# Patient Record
Sex: FEMALE | Race: WHITE | NOT HISPANIC OR LATINO | Employment: FULL TIME | URBAN - NONMETROPOLITAN AREA
[De-identification: names, ages, dates, MRNs, and addresses within clinical notes are randomized per-mention and may not be internally consistent; named-entity substitution may affect disease eponyms.]

---

## 2024-03-26 ENCOUNTER — OFFICE VISIT (OUTPATIENT)
Dept: PRIMARY CARE | Facility: CLINIC | Age: 39
End: 2024-03-26
Payer: COMMERCIAL

## 2024-03-26 VITALS
HEIGHT: 66 IN | BODY MASS INDEX: 31.5 KG/M2 | WEIGHT: 196 LBS | HEART RATE: 85 BPM | OXYGEN SATURATION: 98 % | SYSTOLIC BLOOD PRESSURE: 116 MMHG | DIASTOLIC BLOOD PRESSURE: 70 MMHG

## 2024-03-26 DIAGNOSIS — F32.9 REACTIVE DEPRESSION: ICD-10-CM

## 2024-03-26 DIAGNOSIS — D22.9 MULTIPLE ATYPICAL SKIN MOLES: ICD-10-CM

## 2024-03-26 DIAGNOSIS — Z13.220 LIPID SCREENING: ICD-10-CM

## 2024-03-26 DIAGNOSIS — Z12.31 ENCOUNTER FOR SCREENING MAMMOGRAM FOR MALIGNANT NEOPLASM OF BREAST: Primary | ICD-10-CM

## 2024-03-26 DIAGNOSIS — R53.83 OTHER FATIGUE: ICD-10-CM

## 2024-03-26 PROCEDURE — 1036F TOBACCO NON-USER: CPT | Performed by: INTERNAL MEDICINE

## 2024-03-26 PROCEDURE — 99204 OFFICE O/P NEW MOD 45 MIN: CPT | Performed by: INTERNAL MEDICINE

## 2024-03-26 RX ORDER — ESCITALOPRAM OXALATE 10 MG/1
10 TABLET ORAL DAILY
Qty: 30 TABLET | Refills: 5 | Status: SHIPPED | OUTPATIENT
Start: 2024-03-26 | End: 2024-09-22

## 2024-03-26 ASSESSMENT — ENCOUNTER SYMPTOMS
ARTHRALGIAS: 0
SHORTNESS OF BREATH: 0
NAUSEA: 0
BACK PAIN: 0
BLOOD IN STOOL: 0
DIARRHEA: 0
WHEEZING: 0
PALPITATIONS: 0
VOMITING: 0
COUGH: 0
ABDOMINAL PAIN: 0
FATIGUE: 1

## 2024-03-26 ASSESSMENT — PATIENT HEALTH QUESTIONNAIRE - PHQ9
2. FEELING DOWN, DEPRESSED OR HOPELESS: SEVERAL DAYS
10. IF YOU CHECKED OFF ANY PROBLEMS, HOW DIFFICULT HAVE THESE PROBLEMS MADE IT FOR YOU TO DO YOUR WORK, TAKE CARE OF THINGS AT HOME, OR GET ALONG WITH OTHER PEOPLE: SOMEWHAT DIFFICULT
1. LITTLE INTEREST OR PLEASURE IN DOING THINGS: SEVERAL DAYS
SUM OF ALL RESPONSES TO PHQ9 QUESTIONS 1 AND 2: 2

## 2024-03-26 NOTE — ASSESSMENT & PLAN NOTE
-She lost her  in 2020 and she is still struggling some days with signs and symptoms of depression  -She has had prior counseling  -I have suggested that she would benefit from taking escitalopram and I will give her instructions on this medication  -We will see her back in approximately 3 weeks

## 2024-03-26 NOTE — PATIENT INSTRUCTIONS
As we discussed I have ordered lab work to assess your symptoms of fatigue and in that lab work is included a cholesterol profile and blood sugar.  We would like for you to be fasting anywhere from 8 to 12 hours.  For example if you decided to come to the lab at 8 AM you would simply cut off anything with calories after 8 PM the night before.  You can have water and in fact we encourage you to stay well-hydrated for your lab draw.  You can even have coffee as long as you do not put cream or sugar in it  The staff will also be helping you to get an appointment with the dermatologist so that they can inspect all of your moles and make sure everything is okay  I also sent a prescription to your pharmacy for medication called Lexapro or Escitalopram.  It is simply a 10 mg tablet.  For the first 4 days I would like for you to break it in half and simply take a 5 mg dose so that you can get used to the medication.  After that you will take a full 10 mg dose thereafter.  We recommend that you take it every morning but if it makes you feel tired then go ahead and switch it to bedtime.  Please take this every day and please call if you are not doing well with this medication.  It can take up to 3 weeks for you to start to feel a positive affect and the plateau effect is typically not appreciated until the 6 to 8-week.  I would like to see you back at week 3 to see how you are doing.  The staff will also be helping you to schedule your screening mammogram

## 2024-03-26 NOTE — PROGRESS NOTES
Subjective   Patient ID: Chaya Li is a 38 y.o. female who presents for Establish Care.  HPI  She is here today to get established and we spent some time reviewing her past medical history and her medical concerns.  Unfortunately her  passed away at age 38 back in 2020 and it has been difficult for her and her children.  She states they did do some counseling early on and she stays busy taking care of her children and getting them to their school and their social activities.  She states she sometimes feels withdrawn and avoids going out for social interactions unless somebody approaches her about getting together at [place].  She states at times she feels down and it has been a difficult winter for her.  We did conduct a review of systems and she does complain of some fatigue.  She would like a checkup to make sure she is healthy and we talked about doing several lab tests including a screening lipid profile.  We also see that since she is 38 it would be recommended she have a screening mammogram and we will help get that scheduled.  We talked about her mood and I do feel that she might benefit from starting a low-dose medication such as escitalopram.  We also discussed how exercise could also improve her sense of wellbeing.  We also see on today's examination she has multiple moles and atypical freckles.  I will be sending her to a dermatologist for a full body skin inspection.  We plan on seeing her back to go over test results and she will take the S-Citalopram until her next follow-up visit.  Review of Systems   Constitutional:  Positive for fatigue.   Respiratory:  Negative for cough, shortness of breath and wheezing.    Cardiovascular:  Negative for chest pain, palpitations and leg swelling.   Gastrointestinal:  Negative for abdominal pain, blood in stool, diarrhea, nausea and vomiting.   Musculoskeletal:  Negative for arthralgias and back pain.     Objective   Physical Exam  Vitals and  nursing note reviewed.   Constitutional:       General: She is not in acute distress.     Appearance: Normal appearance.   HENT:      Head: Normocephalic and atraumatic.      Right Ear: External ear normal.      Left Ear: External ear normal.   Eyes:      Conjunctiva/sclera: Conjunctivae normal.   Cardiovascular:      Rate and Rhythm: Normal rate and regular rhythm.      Heart sounds: Normal heart sounds.   Pulmonary:      Effort: Pulmonary effort is normal. No respiratory distress.      Breath sounds: No wheezing.   Abdominal:      Palpations: Abdomen is soft.      Tenderness: There is no abdominal tenderness. There is no guarding.   Musculoskeletal:         General: No swelling. Normal range of motion.   Skin:     General: Skin is warm and dry.   Neurological:      General: No focal deficit present.      Mental Status: She is alert and oriented to person, place, and time.   Psychiatric:         Behavior: Behavior normal.       Assessment/Plan   Problem List Items Addressed This Visit             ICD-10-CM    Lipid screening - Primary Z13.220     -I am ordering a screening lipid profile and she will get that done before her next follow-up visit in 3 weeks         Relevant Orders    Lipid panel    Other fatigue R53.83     -We will be ordering several labs to assess her symptoms of fatigue         Relevant Orders    CBC and Auto Differential    Comprehensive Metabolic Panel    TSH    Reactive depression F32.9     -She lost her  in 2020 and she is still struggling some days with signs and symptoms of depression  -She has had prior counseling  -I have suggested that she would benefit from taking escitalopram and I will give her instructions on this medication  -We will see her back in approximately 3 weeks         Relevant Medications    escitalopram (Lexapro) 10 mg tablet    Multiple atypical skin moles D22.9     -I am referring her to dermatology for a full body skin inspection         Relevant Orders     Referral to Dermatology          [facility], DO

## 2024-03-26 NOTE — ASSESSMENT & PLAN NOTE
-I am ordering a screening lipid profile and she will get that done before her next follow-up visit in 3 weeks

## 2024-04-10 ENCOUNTER — LAB (OUTPATIENT)
Dept: LAB | Facility: LAB | Age: 39
End: 2024-04-10
Payer: COMMERCIAL

## 2024-04-10 DIAGNOSIS — R53.83 OTHER FATIGUE: ICD-10-CM

## 2024-04-10 DIAGNOSIS — Z13.220 LIPID SCREENING: ICD-10-CM

## 2024-04-10 LAB
ALBUMIN SERPL BCP-MCNC: 4.3 G/DL (ref 3.4–5)
ALP SERPL-CCNC: 57 U/L (ref 33–110)
ALT SERPL W P-5'-P-CCNC: 18 U/L (ref 7–45)
ANION GAP SERPL CALC-SCNC: 12 MMOL/L (ref 10–20)
AST SERPL W P-5'-P-CCNC: 17 U/L (ref 9–39)
BASOPHILS # BLD AUTO: 0.03 X10*3/UL (ref 0–0.1)
BASOPHILS NFR BLD AUTO: 0.5 %
BILIRUB SERPL-MCNC: 0.5 MG/DL (ref 0–1.2)
BUN SERPL-MCNC: 9 MG/DL (ref 6–23)
CALCIUM SERPL-MCNC: 8.9 MG/DL (ref 8.6–10.3)
CHLORIDE SERPL-SCNC: 104 MMOL/L (ref 98–107)
CHOLEST SERPL-MCNC: 179 MG/DL (ref 0–199)
CHOLESTEROL/HDL RATIO: 3.7
CO2 SERPL-SCNC: 25 MMOL/L (ref 21–32)
CREAT SERPL-MCNC: 0.66 MG/DL (ref 0.5–1.05)
EGFRCR SERPLBLD CKD-EPI 2021: >90 ML/MIN/1.73M*2
EOSINOPHIL # BLD AUTO: 0.08 X10*3/UL (ref 0–0.7)
EOSINOPHIL NFR BLD AUTO: 1.4 %
ERYTHROCYTE [DISTWIDTH] IN BLOOD BY AUTOMATED COUNT: 13.1 % (ref 11.5–14.5)
GLUCOSE SERPL-MCNC: 88 MG/DL (ref 74–99)
HCT VFR BLD AUTO: 40.9 % (ref 36–46)
HDLC SERPL-MCNC: 49 MG/DL
HGB BLD-MCNC: 13.1 G/DL (ref 12–16)
IMM GRANULOCYTES # BLD AUTO: 0.01 X10*3/UL (ref 0–0.7)
IMM GRANULOCYTES NFR BLD AUTO: 0.2 % (ref 0–0.9)
LDLC SERPL CALC-MCNC: 116 MG/DL
LYMPHOCYTES # BLD AUTO: 1.47 X10*3/UL (ref 1.2–4.8)
LYMPHOCYTES NFR BLD AUTO: 26.5 %
MCH RBC QN AUTO: 29.8 PG (ref 26–34)
MCHC RBC AUTO-ENTMCNC: 32 G/DL (ref 32–36)
MCV RBC AUTO: 93 FL (ref 80–100)
MONOCYTES # BLD AUTO: 0.47 X10*3/UL (ref 0.1–1)
MONOCYTES NFR BLD AUTO: 8.5 %
NEUTROPHILS # BLD AUTO: 3.48 X10*3/UL (ref 1.2–7.7)
NEUTROPHILS NFR BLD AUTO: 62.9 %
NON HDL CHOLESTEROL: 130 MG/DL (ref 0–149)
NRBC BLD-RTO: 0 /100 WBCS (ref 0–0)
PLATELET # BLD AUTO: 220 X10*3/UL (ref 150–450)
POTASSIUM SERPL-SCNC: 4.1 MMOL/L (ref 3.5–5.3)
PROT SERPL-MCNC: 6.9 G/DL (ref 6.4–8.2)
RBC # BLD AUTO: 4.4 X10*6/UL (ref 4–5.2)
SODIUM SERPL-SCNC: 137 MMOL/L (ref 136–145)
TRIGL SERPL-MCNC: 70 MG/DL (ref 0–149)
TSH SERPL-ACNC: 15.99 MIU/L (ref 0.44–3.98)
VLDL: 14 MG/DL (ref 0–40)
WBC # BLD AUTO: 5.5 X10*3/UL (ref 4.4–11.3)

## 2024-04-10 PROCEDURE — 84443 ASSAY THYROID STIM HORMONE: CPT

## 2024-04-10 PROCEDURE — 85025 COMPLETE CBC W/AUTO DIFF WBC: CPT

## 2024-04-10 PROCEDURE — 80053 COMPREHEN METABOLIC PANEL: CPT

## 2024-04-10 PROCEDURE — 80061 LIPID PANEL: CPT

## 2024-04-10 PROCEDURE — 36415 COLL VENOUS BLD VENIPUNCTURE: CPT

## 2024-04-16 ENCOUNTER — LAB (OUTPATIENT)
Dept: LAB | Facility: LAB | Age: 39
End: 2024-04-16
Payer: COMMERCIAL

## 2024-04-16 ENCOUNTER — OFFICE VISIT (OUTPATIENT)
Dept: PRIMARY CARE | Facility: CLINIC | Age: 39
End: 2024-04-16
Payer: COMMERCIAL

## 2024-04-16 VITALS
SYSTOLIC BLOOD PRESSURE: 118 MMHG | BODY MASS INDEX: 31.18 KG/M2 | HEART RATE: 80 BPM | OXYGEN SATURATION: 98 % | HEIGHT: 66 IN | DIASTOLIC BLOOD PRESSURE: 72 MMHG | WEIGHT: 194 LBS

## 2024-04-16 DIAGNOSIS — E03.9 ACQUIRED HYPOTHYROIDISM: ICD-10-CM

## 2024-04-16 DIAGNOSIS — F32.9 REACTIVE DEPRESSION: ICD-10-CM

## 2024-04-16 DIAGNOSIS — E03.9 ACQUIRED HYPOTHYROIDISM: Primary | ICD-10-CM

## 2024-04-16 LAB — T4 FREE SERPL-MCNC: 0.6 NG/DL (ref 0.61–1.12)

## 2024-04-16 PROCEDURE — 99213 OFFICE O/P EST LOW 20 MIN: CPT | Performed by: INTERNAL MEDICINE

## 2024-04-16 PROCEDURE — 86376 MICROSOMAL ANTIBODY EACH: CPT

## 2024-04-16 PROCEDURE — 36415 COLL VENOUS BLD VENIPUNCTURE: CPT

## 2024-04-16 PROCEDURE — 1036F TOBACCO NON-USER: CPT | Performed by: INTERNAL MEDICINE

## 2024-04-16 PROCEDURE — 84439 ASSAY OF FREE THYROXINE: CPT

## 2024-04-16 PROCEDURE — 84481 FREE ASSAY (FT-3): CPT

## 2024-04-16 RX ORDER — LEVOTHYROXINE SODIUM 25 UG/1
25 TABLET ORAL
Qty: 30 TABLET | Refills: 11 | Status: SHIPPED | OUTPATIENT
Start: 2024-04-16 | End: 2025-04-16

## 2024-04-16 NOTE — PROGRESS NOTES
Subjective   Patient ID: Chaya Li is a 38 y.o. female who presents for No chief complaint on file..  HPI  She is here today for follow-up.  Since her last visit she took the Lexapro as directed and she states she has noticed a change in her sense of wellbeing.  She states that when there is a stressful situation going on she is more relaxed.  She gave the example of visitors from the [place].  She states normally she would be rushing around and stressed but she was able to be more relaxed and actually had a very enjoyable time.  We talked about continuing the Lexapro and I told her that as the weeks go on she may even noticed a little bit more improvement.  She also states she has an appointment to see the dermatologist for full body skin inspection.  We also reviewed her laboratory test results and for the most part everything looked great with the exception of her TSH.  She is showing signs of hypothyroidism.  I will have her get some additional lab work today before leaving and I will contact her with the results.  I also sent to her via [employer] handout on lowering cholesterol and hypothyroidism.  I am starting on a very low-dose medication and I will give her specific instructions on the way it is taken.  She of course will call with any problems and otherwise we will see her back in approximately 2 months with a follow-up lab test.  Objective   Physical Exam  Vitals and nursing note reviewed.   Constitutional:       General: She is not in acute distress.     Appearance: Normal appearance.   HENT:      Head: Normocephalic and atraumatic.   Eyes:      Conjunctiva/sclera: Conjunctivae normal.   Cardiovascular:      Rate and Rhythm: Normal rate and regular rhythm.      Heart sounds: Normal heart sounds.   Pulmonary:      Effort: No respiratory distress.      Breath sounds: No wheezing.   Abdominal:      Palpations: Abdomen is soft.      Tenderness: There is no abdominal tenderness. There is no guarding.    Musculoskeletal:         General: No swelling. Normal range of motion.   Skin:     General: Skin is warm and dry.   Neurological:      General: No focal deficit present.      Mental Status: She is alert and oriented to person, place, and time.   Psychiatric:         Behavior: Behavior normal.       Recent Results (from the past 672 hour(s))   CBC and Auto Differential    Collection Time: 04/10/24  9:20 AM   Result Value Ref Range    WBC 5.5 4.4 - 11.3 x10*3/uL    nRBC 0.0 0.0 - 0.0 /100 WBCs    RBC 4.40 4.00 - 5.20 x10*6/uL    Hemoglobin 13.1 12.0 - 16.0 g/dL    Hematocrit 40.9 36.0 - 46.0 %    MCV 93 80 - 100 fL    MCH 29.8 26.0 - 34.0 pg    MCHC 32.0 32.0 - 36.0 g/dL    RDW 13.1 11.5 - 14.5 %    Platelets 220 150 - 450 x10*3/uL    Neutrophils % 62.9 40.0 - 80.0 %    Immature Granulocytes %, Automated 0.2 0.0 - 0.9 %    Lymphocytes % 26.5 13.0 - 44.0 %    Monocytes % 8.5 2.0 - 10.0 %    Eosinophils % 1.4 0.0 - 6.0 %    Basophils % 0.5 0.0 - 2.0 %    Neutrophils Absolute 3.48 1.20 - 7.70 x10*3/uL    Immature Granulocytes Absolute, Automated 0.01 0.00 - 0.70 x10*3/uL    Lymphocytes Absolute 1.47 1.20 - 4.80 x10*3/uL    Monocytes Absolute 0.47 0.10 - 1.00 x10*3/uL    Eosinophils Absolute 0.08 0.00 - 0.70 x10*3/uL    Basophils Absolute 0.03 0.00 - 0.10 x10*3/uL   Comprehensive Metabolic Panel    Collection Time: 04/10/24  9:20 AM   Result Value Ref Range    Glucose 88 74 - 99 mg/dL    Sodium 137 136 - 145 mmol/L    Potassium 4.1 3.5 - 5.3 mmol/L    Chloride 104 98 - 107 mmol/L    Bicarbonate 25 21 - 32 mmol/L    Anion Gap 12 10 - 20 mmol/L    Urea Nitrogen 9 6 - 23 mg/dL    Creatinine 0.66 0.50 - 1.05 mg/dL    eGFR >90 >60 mL/min/1.73m*2    Calcium 8.9 8.6 - 10.3 mg/dL    Albumin 4.3 3.4 - 5.0 g/dL    Alkaline Phosphatase 57 33 - 110 U/L    Total Protein 6.9 6.4 - 8.2 g/dL    AST 17 9 - 39 U/L    Bilirubin, Total 0.5 0.0 - 1.2 mg/dL    ALT 18 7 - 45 U/L   TSH    Collection Time: 04/10/24  9:20 AM   Result Value Ref  Range    Thyroid Stimulating Hormone 15.99 (H) 0.44 - 3.98 mIU/L   Lipid panel    Collection Time: 04/10/24  9:20 AM   Result Value Ref Range    Cholesterol 179 0 - 199 mg/dL    HDL-Cholesterol 49.0 mg/dL    Cholesterol/HDL Ratio 3.7     LDL Calculated 116 (H) <=99 mg/dL    VLDL 14 0 - 40 mg/dL    Triglycerides 70 0 - 149 mg/dL    Non HDL Cholesterol 130 0 - 149 mg/dL       Assessment/Plan   Problem List Items Addressed This Visit             ICD-10-CM    Reactive depression F32.9     -She will continue on the Lexapro 10 mg daily         Acquired hypothyroidism - Primary E03.9     -She has evidence of hypothyroidism  ]-She will get a free T4, free T3, and thyroid peroxidase antibody on the way out today and have agreed to contact her with results  -I am starting her on levothyroxine 25 mcg daily  -I sent her a handout explaining hypothyroidism and we will see her back in approximately 2 months with a repeat TSH         Relevant Medications    levothyroxine (Synthroid) 25 mcg tablet    Other Relevant Orders    T4, free    T3, free    Thyroid Peroxidase (TPO) Antibody    TSH          Roselia De Jesus DO

## 2024-04-16 NOTE — ASSESSMENT & PLAN NOTE
-She has evidence of hypothyroidism  ]-She will get a free T4, free T3, and thyroid peroxidase antibody on the way out today and have agreed to contact her with results  -I am starting her on levothyroxine 25 mcg daily  -I sent her a handout explaining hypothyroidism and we will see her back in approximately 2 months with a repeat TSH

## 2024-04-16 NOTE — PATIENT INSTRUCTIONS
Before leaving today I will have you stop at the lab to get some additional thyroid blood work and once the results are known I will contact you  I also sent to your pharmacy a prescription for levothyroxine 25 mcg to be taken once daily every day.  This medication can be affected by taking medicine, supplements, or food at the same time.  Unfortunately the thyroid medicine can bind to calcium and then you do not absorb it very well.  Please try to take your thyroid medication and isolation with a glass of water.  You should wait an hour before taking any other medicines, supplements, or food.  You could take this in the evening about 4 hours after your supper i.e. before bedtime but set up a schedule so you do not forget  Please read the handouts I sent to you via [employer] on thyroid and also on lowering cholesterol and we invite you to return in approximately 2 months with another TSH.  Please remember to get that TSH done just prior to your next visit and you do not need to be fasting.

## 2024-04-17 LAB
T3FREE SERPL-MCNC: 3.2 PG/ML (ref 2.3–4.2)
THYROPEROXIDASE AB SERPL-ACNC: >1000 IU/ML

## 2024-05-30 ENCOUNTER — HOSPITAL ENCOUNTER (OUTPATIENT)
Dept: RADIOLOGY | Facility: CLINIC | Age: 39
End: 2024-05-30
Payer: COMMERCIAL

## 2024-05-30 DIAGNOSIS — Z12.31 SCREENING MAMMOGRAM FOR BREAST CANCER: ICD-10-CM

## 2024-06-07 ENCOUNTER — HOSPITAL ENCOUNTER (OUTPATIENT)
Dept: RADIOLOGY | Facility: CLINIC | Age: 39
End: 2024-06-07
Payer: COMMERCIAL

## 2024-06-07 DIAGNOSIS — Z12.31 SCREENING MAMMOGRAM FOR BREAST CANCER: ICD-10-CM

## 2024-06-13 ENCOUNTER — LAB (OUTPATIENT)
Dept: LAB | Facility: LAB | Age: 39
End: 2024-06-13
Payer: COMMERCIAL

## 2024-06-13 DIAGNOSIS — E03.9 ACQUIRED HYPOTHYROIDISM: ICD-10-CM

## 2024-06-13 LAB — TSH SERPL-ACNC: 8.24 MIU/L (ref 0.44–3.98)

## 2024-06-13 PROCEDURE — 36415 COLL VENOUS BLD VENIPUNCTURE: CPT

## 2024-06-13 PROCEDURE — 84443 ASSAY THYROID STIM HORMONE: CPT

## 2024-06-18 ENCOUNTER — APPOINTMENT (OUTPATIENT)
Dept: PRIMARY CARE | Facility: CLINIC | Age: 39
End: 2024-06-18
Payer: COMMERCIAL

## 2024-06-18 VITALS
HEART RATE: 84 BPM | OXYGEN SATURATION: 98 % | HEIGHT: 66 IN | SYSTOLIC BLOOD PRESSURE: 120 MMHG | BODY MASS INDEX: 31.66 KG/M2 | DIASTOLIC BLOOD PRESSURE: 76 MMHG | WEIGHT: 197 LBS

## 2024-06-18 DIAGNOSIS — E03.9 ACQUIRED HYPOTHYROIDISM: Primary | ICD-10-CM

## 2024-06-18 DIAGNOSIS — F32.9 REACTIVE DEPRESSION: ICD-10-CM

## 2024-06-18 PROBLEM — R53.83 OTHER FATIGUE: Status: RESOLVED | Noted: 2024-03-26 | Resolved: 2024-06-18

## 2024-06-18 PROBLEM — Z13.220 LIPID SCREENING: Status: RESOLVED | Noted: 2024-03-26 | Resolved: 2024-06-18

## 2024-06-18 PROCEDURE — 99213 OFFICE O/P EST LOW 20 MIN: CPT | Performed by: INTERNAL MEDICINE

## 2024-06-18 PROCEDURE — 1036F TOBACCO NON-USER: CPT | Performed by: INTERNAL MEDICINE

## 2024-06-18 RX ORDER — LEVOTHYROXINE SODIUM 50 UG/1
50 TABLET ORAL DAILY
Qty: 90 TABLET | Refills: 1 | Status: SHIPPED | OUTPATIENT
Start: 2024-06-18 | End: 2025-06-18

## 2024-06-18 RX ORDER — ESCITALOPRAM OXALATE 10 MG/1
10 TABLET ORAL DAILY
Qty: 90 TABLET | Refills: 1 | Status: SHIPPED | OUTPATIENT
Start: 2024-06-18 | End: 2024-12-15

## 2024-06-18 ASSESSMENT — ENCOUNTER SYMPTOMS
COUGH: 0
BLOOD IN STOOL: 0
ARTHRALGIAS: 0
DIARRHEA: 0
FATIGUE: 0
WHEEZING: 0
NAUSEA: 0
ABDOMINAL PAIN: 0
VOMITING: 0
PALPITATIONS: 0
SHORTNESS OF BREATH: 0
BACK PAIN: 0

## 2024-06-18 NOTE — PATIENT INSTRUCTIONS
As we discussed I am highly impressed with your exercise routine and keep up the fantastic work  If you would like to see a nutritionist please let me know as we could help facilitate a referral and otherwise I would recommend you do some research on healthy diet that includes protein and good nutrition  I am pleased that you will be getting your mammogram soon and also that you had a checkup with the dermatologist  As you know we increase the dose of your levothyroxine from 25 mcg up to 50 mcg and you can use up your 25 mcg dose by taking 2 at the very same time until you run out.  The new dose will be for 50 mcg and you will take that once a day.  Please remember that you charted take your medication in isolation without any food, other medicines, or vitamins.  You need to wait an hour because sometimes foods and vitamins can influence your ability to absorb the medicine.  Please go to the lab in approximately 6 to 8 weeks for another thyroid blood test and I will call you with the results  Otherwise we will see you back in 6 months

## 2024-06-18 NOTE — PROGRESS NOTES
Subjective   Patient ID: Chaya Li is a 39 y.o. female who presents for Follow-up (2 MO FUV).  HPI  She is here today for her general checkup.  She is looking fantastic and reports feeling well.  She states she started walking and back in May she walked 101 miles collectively during the month.  She has a friend that she walks with and her current goal is to get 20 miles in a week.  She states she is feeling great but she is discouraged about her weight.  We had a long conversation about muscle mass and fat and how that can convert into the scales.  We talked about measuring inches we also talked about getting into a good nutrition habit where she is getting plenty of protein and just slightly cutting back on calories.  We talked about even seeing a nutritionist but she will let me know.  In the meantime her mood has been good and she has received comments from her loved ones indicating that she seems brighter and happier.  She is tolerating the Lexapro so we are providing a refill today.  We also discovered that she is under supplemented with her thyroid medicine.  She states she is very faithful about taking it every day some increasing the dose from 25 mcg up to 50 mcg daily.  She will go back to the lab in approximately 6 to 8 weeks and I will call her with the results.  She also saw the dermatologist for a full body skin inspection and will be going once a year for checkup.  She is also scheduled soon to get her screening mammogram.  Review of Systems   Constitutional:  Negative for fatigue.   Respiratory:  Negative for cough, shortness of breath and wheezing.    Cardiovascular:  Negative for chest pain, palpitations and leg swelling.   Gastrointestinal:  Negative for abdominal pain, blood in stool, diarrhea, nausea and vomiting.   Musculoskeletal:  Negative for arthralgias and back pain.     Objective   Physical Exam  Vitals and nursing note reviewed.   Constitutional:       General: She is not in  acute distress.     Appearance: Normal appearance.   HENT:      Head: Normocephalic and atraumatic.   Eyes:      Conjunctiva/sclera: Conjunctivae normal.   Cardiovascular:      Rate and Rhythm: Normal rate and regular rhythm.      Heart sounds: Normal heart sounds.   Pulmonary:      Effort: No respiratory distress.      Breath sounds: No wheezing.   Abdominal:      Palpations: Abdomen is soft.      Tenderness: There is no abdominal tenderness. There is no guarding.   Musculoskeletal:         General: No swelling. Normal range of motion.   Skin:     General: Skin is warm and dry.   Neurological:      General: No focal deficit present.      Mental Status: She is alert and oriented to person, place, and time.   Psychiatric:         Behavior: Behavior normal.       Recent Results (from the past 672 hour(s))   TSH    Collection Time: 06/13/24  1:27 PM   Result Value Ref Range    Thyroid Stimulating Hormone 8.24 (H) 0.44 - 3.98 mIU/L   ]  Assessment/Plan   Problem List Items Addressed This Visit             ICD-10-CM    Reactive depression F32.9     -Doing well at this time and we will see her back in 6 months for another check.         Relevant Medications    escitalopram (Lexapro) 10 mg tablet    Acquired hypothyroidism - Primary E03.9     -Her TSH came back at 8.24 indicating that she is under supplemented  -She states she takes her thyroid medication every day without fail  -I am increasing from 25 mcg up to 50 mcg daily and she will go back to the lab in approximately 6 to 8 weeks for another TSH.  I have agreed to contact her with the results         Relevant Medications    levothyroxine (Synthroid) 50 mcg tablet    Other Relevant Orders    TSH          Roselia De Jesus,

## 2024-06-18 NOTE — ASSESSMENT & PLAN NOTE
-Her TSH came back at 8.24 indicating that she is under supplemented  -She states she takes her thyroid medication every day without fail  -I am increasing from 25 mcg up to 50 mcg daily and she will go back to the lab in approximately 6 to 8 weeks for another TSH.  I have agreed to contact her with the results

## 2024-06-21 ENCOUNTER — HOSPITAL ENCOUNTER (OUTPATIENT)
Dept: RADIOLOGY | Facility: CLINIC | Age: 39
Discharge: HOME | End: 2024-06-21
Payer: COMMERCIAL

## 2024-06-21 VITALS — HEIGHT: 66 IN | BODY MASS INDEX: 31.34 KG/M2 | WEIGHT: 195 LBS

## 2024-06-21 DIAGNOSIS — R92.8 ABNORMAL MAMMOGRAM OF RIGHT BREAST: Primary | ICD-10-CM

## 2024-06-21 DIAGNOSIS — Z12.31 ENCOUNTER FOR SCREENING MAMMOGRAM FOR MALIGNANT NEOPLASM OF BREAST: ICD-10-CM

## 2024-06-21 PROCEDURE — 77063 BREAST TOMOSYNTHESIS BI: CPT | Performed by: RADIOLOGY

## 2024-06-21 PROCEDURE — 77067 SCR MAMMO BI INCL CAD: CPT | Performed by: RADIOLOGY

## 2024-06-21 PROCEDURE — 77067 SCR MAMMO BI INCL CAD: CPT

## 2024-06-25 ENCOUNTER — HOSPITAL ENCOUNTER (OUTPATIENT)
Dept: RADIOLOGY | Facility: HOSPITAL | Age: 39
Discharge: HOME | End: 2024-06-25
Payer: COMMERCIAL

## 2024-06-25 DIAGNOSIS — R92.8 ABNORMAL MAMMOGRAM OF RIGHT BREAST: ICD-10-CM

## 2024-06-25 PROCEDURE — 76642 ULTRASOUND BREAST LIMITED: CPT | Mod: RT

## 2024-06-25 PROCEDURE — 76642 ULTRASOUND BREAST LIMITED: CPT | Mod: RIGHT SIDE | Performed by: RADIOLOGY

## 2024-06-25 NOTE — RESULT ENCOUNTER NOTE
I called to let her know that her mammogram is showing simple cysts and nothing of a suspicious character.  She obviously was glad to hear the news

## 2024-08-19 ENCOUNTER — LAB (OUTPATIENT)
Facility: LAB | Age: 39
End: 2024-08-19
Payer: COMMERCIAL

## 2024-08-19 DIAGNOSIS — E03.9 ACQUIRED HYPOTHYROIDISM: ICD-10-CM

## 2024-08-19 LAB — TSH SERPL-ACNC: 8.87 MIU/L (ref 0.44–3.98)

## 2024-08-19 PROCEDURE — 84443 ASSAY THYROID STIM HORMONE: CPT

## 2024-08-19 PROCEDURE — 36415 COLL VENOUS BLD VENIPUNCTURE: CPT

## 2024-08-20 DIAGNOSIS — E03.9 ACQUIRED HYPOTHYROIDISM: Primary | ICD-10-CM

## 2024-08-20 RX ORDER — LEVOTHYROXINE SODIUM 75 UG/1
75 TABLET ORAL DAILY
Qty: 90 TABLET | Refills: 1 | Status: SHIPPED | OUTPATIENT
Start: 2024-08-20 | End: 2025-08-20

## 2024-08-28 ENCOUNTER — OFFICE VISIT (OUTPATIENT)
Dept: URGENT CARE | Facility: CLINIC | Age: 39
End: 2024-08-28

## 2024-08-28 VITALS
RESPIRATION RATE: 18 BRPM | HEART RATE: 80 BPM | TEMPERATURE: 98.3 F | SYSTOLIC BLOOD PRESSURE: 134 MMHG | BODY MASS INDEX: 31.34 KG/M2 | OXYGEN SATURATION: 96 % | DIASTOLIC BLOOD PRESSURE: 59 MMHG | WEIGHT: 195 LBS | HEIGHT: 66 IN

## 2024-08-28 DIAGNOSIS — B02.9 HERPES ZOSTER WITHOUT COMPLICATION: Primary | ICD-10-CM

## 2024-08-28 PROCEDURE — 99213 OFFICE O/P EST LOW 20 MIN: CPT | Performed by: NURSE PRACTITIONER

## 2024-08-28 PROCEDURE — 87798 DETECT AGENT NOS DNA AMP: CPT

## 2024-08-28 RX ORDER — VALACYCLOVIR HYDROCHLORIDE 1 G/1
1000 TABLET, FILM COATED ORAL 2 TIMES DAILY
Qty: 14 TABLET | Refills: 0 | Status: SHIPPED | OUTPATIENT
Start: 2024-08-28 | End: 2024-09-04

## 2024-08-28 RX ORDER — TRIAMCINOLONE ACETONIDE 5 MG/G
CREAM TOPICAL 3 TIMES DAILY
Qty: 30 G | Refills: 0 | Status: SHIPPED | OUTPATIENT
Start: 2024-08-28

## 2024-08-28 NOTE — PROGRESS NOTES
39 y.o. female presents for evaluation of rash to right side of face for the past day. States it is very itchy. States she mowed the yard and possible poison ivy exposure. Also has been under significant stress as today is the anniversary of her husbands passing. Denies visual changes, eye pain, drainage from eye, headache, fatigue, fever, body aches or any other associated symptoms. No otc meds for symptoms. No other complaints.      Vitals:    08/28/24 0950   BP: 134/59   Pulse: 80   Resp: 18   Temp: 36.8 °C (98.3 °F)   SpO2: 96%       Allergies   Allergen Reactions    Penicillins Other and Nausea/vomiting     VOIMITING       Medication Documentation Review Audit       Reviewed by Chacho Parker MA (Medical Assistant) on 08/28/24 at 0950      Medication Order Taking? Sig Documenting Provider Last Dose Status   escitalopram (Lexapro) 10 mg tablet [id number] Yes Take 1 tablet (10 mg) by mouth once daily. Roselia S Royal, DO Taking Active   levothyroxine (Synthroid, Levoxyl) 75 mcg tablet [id number] Yes Take 1 tablet (75 mcg) by mouth early in the morning.. Take on an empty stomach at the same time each day, either 30 to 60 minutes prior to breakfast Roselia S Royal, DO Taking Active                    No past medical history on file.    Past Surgical History:   Procedure Laterality Date    ENDOMETRIAL ABLATION  2018       ROS  See HPI    Physical Exam  Vitals and nursing note reviewed.   Constitutional:       Appearance: Normal appearance.   Skin:     General: Skin is warm and dry.      Findings: Rash (mildly erythematous and mildy vesicular rash to right side of face from eyebrow to chin in a linear fashion without drainage or crusting) present.   Neurological:      General: No focal deficit present.      Mental Status: She is alert and oriented to person, place, and time.   Psychiatric:         Mood and Affect: Mood normal.         Behavior: Behavior normal.           Assessment/Plan/MDM  Chaya was seen today  for rash.  Diagnoses and all orders for this visit:  Herpes zoster without complication (Primary)  -     VZV By PCR Qualitative Skin/Mucosa Lesion  -     valACYclovir (Valtrex) 1 gram tablet; Take 1 tablet (1,000 mg) by mouth 2 times a day for 7 days.  -     triamcinolone (Kenalog) 0.5 % cream; Apply topically 3 times a day.      Patient's clinical presentation is otherwise unremarkable at this time. Patient is discharged with instructions to follow-up with primary care or seek emergency medical attention for worsening symptoms or any new concerns.    I did personally review Chaya's past medical history, surgical history, social history, as well as family history (when relevant).  In this case, I also oversaw the her drug management by reviewing her medication list, allergy list, as well as the medications that I prescribed during the UC course and/or recommended as an out-patient (including possible OTC medications such as acetaminophen, NSAIDs , etc).    After reviewing the items above, I did not look at previous medical documentation, such as recent hospitalizations, office visits, and/or recent consultations with PCP/specialist.                          SDOH:   Another factor that I considered in Chaya's care was her Social Determinants of Health (SDOH). During this UC encounter, she did not have social determinants of health. Those SDOH influencing Chaya's care are: none      Tomi Padgett CNP  [facility]  [phone]

## 2024-08-29 LAB — VZV DNA SPEC QL NAA+PROBE: NOT DETECTED

## 2024-10-15 ENCOUNTER — OFFICE VISIT (OUTPATIENT)
Dept: URGENT CARE | Facility: CLINIC | Age: 39
End: 2024-10-15
Payer: COMMERCIAL

## 2024-10-15 VITALS
OXYGEN SATURATION: 95 % | DIASTOLIC BLOOD PRESSURE: 86 MMHG | RESPIRATION RATE: 16 BRPM | TEMPERATURE: 98.7 F | HEART RATE: 98 BPM | WEIGHT: 200 LBS | SYSTOLIC BLOOD PRESSURE: 118 MMHG | HEIGHT: 66 IN | BODY MASS INDEX: 32.14 KG/M2

## 2024-10-15 DIAGNOSIS — J01.90 ACUTE RHINOSINUSITIS: Primary | ICD-10-CM

## 2024-10-15 DIAGNOSIS — J20.9 ACUTE BRONCHITIS WITH BRONCHOSPASM: ICD-10-CM

## 2024-10-15 PROCEDURE — 99212 OFFICE O/P EST SF 10 MIN: CPT | Performed by: PHYSICIAN ASSISTANT

## 2024-10-15 RX ORDER — AZITHROMYCIN 250 MG/1
TABLET, FILM COATED ORAL
Qty: 6 TABLET | Refills: 0 | Status: SHIPPED | OUTPATIENT
Start: 2024-10-15 | End: 2024-10-20

## 2024-10-15 NOTE — PROGRESS NOTES
[facility] URGENT CARE   MICK NOTE:      Name: Chaya Li, 39 y.o.    CSN:[id number]   MRN:[id number]    PCP: Roselia De Jesus, DO    ALL:    Allergies   Allergen Reactions    Penicillins Other and Nausea/vomiting     VOIMITING       History:    Chief Complaint: Cough (Cough, chest hurts from coughing x 5 days)    Encounter Date: 10/15/2024      HPI: The history was obtained from the patient. Chaya is a 39 y.o. female, who presents with a chief complaint of Cough (Cough, chest hurts from coughing x 5 days) hasn't used anything yet for her coughing/congestion due to being quite busy with her children's affairs. She has no fever, smell/taste changes, denies any N/V, but does endorse some pleuritic chest discomfort with coughing.    She is planning to travel to [place] for a family trip leaving Friday, 10/18/24.    PMHx:    Past Medical History:   Diagnosis Date    Disease of thyroid gland               Current Outpatient Medications   Medication Sig Dispense Refill    escitalopram (Lexapro) 10 mg tablet Take 1 tablet (10 mg) by mouth once daily. 90 tablet 1    levothyroxine (Synthroid, Levoxyl) 75 mcg tablet Take 1 tablet (75 mcg) by mouth early in the morning.. Take on an empty stomach at the same time each day, either 30 to 60 minutes prior to breakfast 90 tablet 1    azithromycin (Zithromax) 250 mg tablet Take 2 tablets (500 mg) on  Day 1,  followed by 1 tablet (250 mg) once daily on Days 2 through 5. 6 tablet 0    triamcinolone (Kenalog) 0.5 % cream Apply topically 3 times a day. (Patient not taking: Reported on 10/15/2024) 30 g 0     No current facility-administered medications for this visit.         PMSx:    Past Surgical History:   Procedure Laterality Date    ENDOMETRIAL ABLATION  2018       Fam Hx:   Family History   Problem Relation Name Age of Onset    Hypertension Mother      Hypertension Father      Alzheimer's disease Maternal Grandmother      Diabetes Paternal Grandmother          SOC. Hx:     Social History     Socioeconomic History    Marital status:      Spouse name: Not on file    Number of children: Not on file    Years of education: Not on file    Highest education level: Not on file   Occupational History    Not on file   Tobacco Use    Smoking status: Never    Smokeless tobacco: Never   Vaping Use    Vaping status: Never Used   Substance and Sexual Activity    Alcohol use: Yes     Comment: occ    Drug use: Never    Sexual activity: Not on file   Other Topics Concern    Not on file   Social History Narrative    Not on file     Social Determinants of Health     Financial Resource Strain: Not on file   Food Insecurity: Not on file   Transportation Needs: Not on file   Physical Activity: Not on file   Stress: Not on file   Social Connections: Not on file   Intimate Partner Violence: Not on file   Housing Stability: Not on file         Vitals:    10/15/24 1217   BP: 118/86   Pulse: 98   Resp: 16   Temp: 37.1 °C (98.7 °F)   SpO2: 95%     90.7 kg (200 lb)          Physical Exam  Vitals reviewed.   Constitutional:       Appearance: Normal appearance. She is normal weight.   HENT:      Head: Normocephalic and atraumatic.      Nose: Mucosal edema and congestion present.      Right Turbinates: Enlarged and swollen.      Left Turbinates: Enlarged and swollen.      Mouth/Throat:      Mouth: Mucous membranes are moist.      Tongue: No lesions.      Pharynx: Oropharynx is clear. Uvula midline.   Eyes:      Extraocular Movements: Extraocular movements intact.   Cardiovascular:      Rate and Rhythm: Normal rate and regular rhythm.   Pulmonary:      Effort: Pulmonary effort is normal.      Breath sounds: Normal breath sounds.   Abdominal:      General: Abdomen is flat.   Musculoskeletal:         General: Normal range of motion.      Cervical back: Normal range of motion and neck supple.   Skin:     General: Skin is warm.      Capillary Refill: Capillary refill takes less than 2 seconds.    Neurological:      Mental Status: She is alert and oriented to person, place, and time.   Psychiatric:         Behavior: Behavior normal.         ____________________________________________________________________    I did personally review Chaya's past medical history, surgical history, social history, as well as family history (when relevant).  In this case, I also oversaw the her drug management by reviewing her medication list, allergy list, as well as the medications that I prescribed during the UC course and/or recommended as an out-patient (including possible OTC medications such as acetaminophen, NSAIDs , etc).    After reviewing the items above, I did look at previous medical documentation, such as recent hospitalizations, office visits, and/or recent consultations with PCP/specialist.                          SDOH:   Another factor that I considered in Chaya's care was her Social Determinants of Health (SDOH). During this UC encounter, she did not have social determinants of health. Those SDOH influencing Chaya's care are: none      _____________________________________________________________________      UC COURSE/MEDICAL DECISION MAKING:    Chaya is a 39 y.o., who presents with a working diagnosis of   1. Acute rhinosinusitis    2. Acute bronchitis with bronchospasm     with a differential to include: Influenza, parainfluenza, rhinovirus, adenovirus, metapneumovirus, coronavirus, COVID-19, postnasal drip, strep pharyngitis, GERD, retropharyngeal abscess, tonsillitis, adenitis, seasonal allergies    1) URI with cough/congestion: supportive care recommended, discussed use of OTC analgesics APAP/NSAID for fever/pain control, discussed hydration & when to seek re-evaluation.    Given the considerations about the travel I did recommend that she use some sort of decongestant such as Sudafed either in the mixture of Robitussin DM or straight to assist with some of the barrier pressures that might occur  throughout her ears.  Also recommend use of Zithromax if symptoms progress to a greenish-brown sputum production.  She was reassured and discharged.        Dann Gustafson PA-C   Advanced Practice Provider  [facility] URGENT [facility]

## 2024-12-12 ENCOUNTER — LAB (OUTPATIENT)
Facility: LAB | Age: 39
End: 2024-12-12
Payer: COMMERCIAL

## 2024-12-12 DIAGNOSIS — E03.9 ACQUIRED HYPOTHYROIDISM: ICD-10-CM

## 2024-12-12 LAB — TSH SERPL-ACNC: 5.79 MIU/L (ref 0.44–3.98)

## 2024-12-12 PROCEDURE — 84443 ASSAY THYROID STIM HORMONE: CPT

## 2024-12-12 PROCEDURE — 36415 COLL VENOUS BLD VENIPUNCTURE: CPT

## 2024-12-17 ENCOUNTER — HOSPITAL ENCOUNTER (OUTPATIENT)
Dept: RADIOLOGY | Facility: HOSPITAL | Age: 39
Discharge: HOME | End: 2024-12-17
Payer: COMMERCIAL

## 2024-12-17 ENCOUNTER — APPOINTMENT (OUTPATIENT)
Dept: PRIMARY CARE | Facility: CLINIC | Age: 39
End: 2024-12-17
Payer: COMMERCIAL

## 2024-12-17 VITALS
SYSTOLIC BLOOD PRESSURE: 124 MMHG | DIASTOLIC BLOOD PRESSURE: 76 MMHG | WEIGHT: 205.4 LBS | OXYGEN SATURATION: 96 % | HEIGHT: 66 IN | BODY MASS INDEX: 33.01 KG/M2 | HEART RATE: 80 BPM

## 2024-12-17 DIAGNOSIS — M79.672 HEEL PAIN, CHRONIC, LEFT: ICD-10-CM

## 2024-12-17 DIAGNOSIS — E03.9 ACQUIRED HYPOTHYROIDISM: ICD-10-CM

## 2024-12-17 DIAGNOSIS — M79.672 HEEL PAIN, CHRONIC, LEFT: Primary | ICD-10-CM

## 2024-12-17 DIAGNOSIS — L70.8 OTHER ACNE: ICD-10-CM

## 2024-12-17 DIAGNOSIS — G89.29 HEEL PAIN, CHRONIC, LEFT: ICD-10-CM

## 2024-12-17 DIAGNOSIS — G89.29 HEEL PAIN, CHRONIC, LEFT: Primary | ICD-10-CM

## 2024-12-17 DIAGNOSIS — F32.9 REACTIVE DEPRESSION: ICD-10-CM

## 2024-12-17 PROCEDURE — 3008F BODY MASS INDEX DOCD: CPT | Performed by: INTERNAL MEDICINE

## 2024-12-17 PROCEDURE — 73620 X-RAY EXAM OF FOOT: CPT | Mod: LT

## 2024-12-17 PROCEDURE — 99214 OFFICE O/P EST MOD 30 MIN: CPT | Performed by: INTERNAL MEDICINE

## 2024-12-17 PROCEDURE — 1036F TOBACCO NON-USER: CPT | Performed by: INTERNAL MEDICINE

## 2024-12-17 RX ORDER — LEVOTHYROXINE SODIUM 88 UG/1
88 TABLET ORAL DAILY
Qty: 90 TABLET | Refills: 1 | Status: SHIPPED | OUTPATIENT
Start: 2024-12-17 | End: 2025-12-17

## 2024-12-17 RX ORDER — ESCITALOPRAM OXALATE 20 MG/1
20 TABLET ORAL DAILY
Qty: 90 TABLET | Refills: 1 | Status: SHIPPED | OUTPATIENT
Start: 2024-12-17 | End: 2025-06-15

## 2024-12-17 ASSESSMENT — ENCOUNTER SYMPTOMS
CHEST TIGHTNESS: 0
COUGH: 0
PALPITATIONS: 0
ABDOMINAL PAIN: 0
FATIGUE: 1
BACK PAIN: 0
DIARRHEA: 0
SHORTNESS OF BREATH: 0
WHEEZING: 0
VOMITING: 0
BLOOD IN STOOL: 0
NAUSEA: 0
ARTHRALGIAS: 0

## 2024-12-17 NOTE — ASSESSMENT & PLAN NOTE
-The holidays have been difficult and therefore I am increasing the dose of her Lexapro from 10 mg up to 20 mg daily  ]-I also strongly that she would benefit from counseling and I am going to facilitate a referral today-she will call if she is not doing well and otherwise I will see her back in a couple of months

## 2024-12-17 NOTE — ASSESSMENT & PLAN NOTE
-Her most recent TSH was out of acceptable range  -I will have her increase her levothyroxine from 77 mcg up to 88 mcg and we will recheck her TSH in approximately 8 weeks with follow-up.  My hope is that this will help with her fatigue

## 2024-12-17 NOTE — ASSESSMENT & PLAN NOTE
-She has had 2 separate injuries to her left foot and I do feel at this time it deserves an x-ray.  I will contact her with the results.  She has invested in expensive walking shoes but if her symptoms persist I will have her see podiatry

## 2024-12-17 NOTE — ASSESSMENT & PLAN NOTE
-For her acne I have recommended a hypoallergenic regimen and to limit what she puts on her skin if at all possible  -I am recommending Dove sensitive soap for washing her skin and I am recommending over-the-counter benzyl peroxide as a first-line treatment.  If this is not helpful she will let me know

## 2024-12-17 NOTE — PATIENT INSTRUCTIONS
Patient instructions  As we discussed your lab work is showing that you are not getting enough thyroid hormone which obviously could make you tired.  As you are aware of increase the dose of your levothyroxine from 75 mcg up to 88 mcg daily.  Please remember to take it every single day in isolation.  In approximately 2 months she will go back to the lab for another TSH and I do want to see you back shortly after that lab draw to go over how you are doing  I also increase the dose of your Lexapro from 10 mg up to 20 mg daily.  You can use up any 10 mg tablet you have by taking 2 at the very same time.  If you feel like this is making you worse please call me right away.  I also feel strongly that you would benefit from some therapy and the staff will be calling you to help make an appointment.  If you do not hear from us within 5 business days please call us   I also ordered an x-ray of your left foot and you can go immediately to the hospital from this appointment to get the x-ray done.  Once the results are known I will contact you.  I also recommend for your skin that you try not to put anything on your skin if at all possible and use Dove sensitive soap for washing your face.  Use the over-the-counter benzyl peroxide as directed and if this is not successful please call me  We will see you back in approximately 2 months

## 2024-12-17 NOTE — PROGRESS NOTES
Subjective   Patient ID: Chaya Li is a 39 y.o. female who presents for Follow-up.  HPI  She is here today for a checkup.  We conducted a review of systems and she does complain of some fatigue.  Her thyroid blood test is showing that she is still a little bit under supplemented and she does take her medication every single day faithfully and isolation.  I have decided to increase the dose of her thyroid and we will reevaluate her blood work in approximately 8 weeks.  I told her we will inched up the dose until we get a right.  I am hoping that this will help improve her fatigue.  She states that this winter season and holiday season has been a little bit trying.  She states that a lot of changes of occurred with her family dynamics and she is still has a lot of social anxiety.  We decided that she could benefit greatly from increasing her Lexapro dose from 10 up to 20 mg daily.  I also feel strongly that she can also be helped through counseling just to help her work through her anxieties and insecurities and to help her with dealing with the worries that she has from day-to-day.  She is agreeable and so we will help facilitate a referral.  She also injured her left foot on 2 separate occasions.  She injured her foot in July and then again in September when she inadvertently stepped in a hole in the yard causing her to twist her ankle.  Despite the passage of time she is still having some symptoms and pain including pain around her heel.  She states she is invested in expensive shoes and she is having problems doing her regular exercise routine.  I will have her get an x-ray and I will call her with the results.  I told her we will likely have her see podiatry for her symptoms.  She also has been having problems with acne and we talked about using hypoallergenic skin products.  In fact I want her to limit anything she is putting on her skin if at all possible.  We talked about trying benzyl peroxide as  this is often recommended is the first-line treatment for acne.  If this is not helpful she will let me know.  She did see the dermatologist recently for a full-body skin inspection and all was well at that time.  I decided to go to see her back in about 8 weeks for follow-up but she knows she can call sooner if things or not doing well.  Review of Systems   Constitutional:  Positive for fatigue.   Respiratory:  Negative for cough, chest tightness, shortness of breath and wheezing.    Cardiovascular:  Negative for chest pain, palpitations and leg swelling.   Gastrointestinal:  Negative for abdominal pain, blood in stool, diarrhea, nausea and vomiting.   Musculoskeletal:  Negative for arthralgias and back pain.     Objective   Physical Exam  Vitals and nursing note reviewed.   Constitutional:       General: She is not in acute distress.     Appearance: Normal appearance.   HENT:      Head: Normocephalic and atraumatic.   Eyes:      Conjunctiva/sclera: Conjunctivae normal.   Cardiovascular:      Rate and Rhythm: Normal rate and regular rhythm.      Heart sounds: Normal heart sounds.   Pulmonary:      Effort: No respiratory distress.      Breath sounds: No wheezing.   Abdominal:      Palpations: Abdomen is soft.      Tenderness: There is no abdominal tenderness. There is no guarding.   Musculoskeletal:         General: No swelling. Normal range of motion.   Skin:     General: Skin is warm and dry.   Neurological:      General: No focal deficit present.      Mental Status: She is alert and oriented to person, place, and time.   Psychiatric:         Behavior: Behavior normal.       Recent Results (from the past 4 weeks)   TSH    Collection Time: 12/12/24 10:36 AM   Result Value Ref Range    Thyroid Stimulating Hormone 5.79 (H) 0.44 - 3.98 mIU/L       Assessment/Plan   Problem List Items Addressed This Visit             ICD-10-CM    Reactive depression F32.9     -The holidays have been difficult and therefore I am  increasing the dose of her Lexapro from 10 mg up to 20 mg daily  ]-I also strongly that she would benefit from counseling and I am going to facilitate a referral today-she will call if she is not doing well and otherwise I will see her back in a couple of months           Relevant Medications    escitalopram (Lexapro) 20 mg tablet    Other Relevant Orders    Referral to Psychology    Acquired hypothyroidism E03.9     -Her most recent TSH was out of acceptable range  -I will have her increase her levothyroxine from 77 mcg up to 88 mcg and we will recheck her TSH in approximately 8 weeks with follow-up.  My hope is that this will help with her fatigue         Relevant Medications    levothyroxine (Synthroid, Levoxyl) 88 mcg tablet    Heel pain, chronic, left - Primary M79.672, G89.29     -She has had 2 separate injuries to her left foot and I do feel at this time it deserves an x-ray.  I will contact her with the results.  She has invested in expensive walking shoes but if her symptoms persist I will have her see podiatry         Relevant Orders    XR foot left 1-2 views    Other acne L70.8     -For her acne I have recommended a hypoallergenic regimen and to limit what she puts on her skin if at all possible  -I am recommending Dove sensitive soap for washing her skin and I am recommending over-the-counter benzyl peroxide as a first-line treatment.  If this is not helpful she will let me know        Patient instructions  As we discussed your lab work is showing that you are not getting enough thyroid hormone which obviously could make you tired.  As you are aware of increase the dose of your levothyroxine from 75 mcg up to 88 mcg daily.  Please remember to take it every single day in isolation.  In approximately 2 months she will go back to the lab for another TSH and I do want to see you back shortly after that lab draw to go over how you are doing  I also increase the dose of your Lexapro from 10 mg up to 20 mg  daily.  You can use up any 10 mg tablet you have by taking 2 at the very same time.  If you feel like this is making you worse please call me right away.  I also feel strongly that you would benefit from some therapy and the staff will be calling you to help make an appointment.  If you do not hear from us within 5 business days please call us   I also ordered an x-ray of your left foot and you can go immediately to the hospital from this appointment to get the x-ray done.  Once the results are known I will contact you.  I also recommend for your skin that you try not to put anything on your skin if at all possible and use Dove sensitive soap for washing your face.  Use the over-the-counter benzyl peroxide as directed and if this is not successful please call me  We will see you back in approximately 2 months         Roselia De Jesus DO

## 2025-02-15 LAB — TSH SERPL-ACNC: 2.81 MIU/L

## 2025-02-18 ENCOUNTER — APPOINTMENT (OUTPATIENT)
Age: 40
End: 2025-02-18
Payer: COMMERCIAL

## 2025-02-18 VITALS
OXYGEN SATURATION: 98 % | DIASTOLIC BLOOD PRESSURE: 88 MMHG | WEIGHT: 204.6 LBS | HEART RATE: 78 BPM | HEIGHT: 66 IN | SYSTOLIC BLOOD PRESSURE: 128 MMHG | BODY MASS INDEX: 32.88 KG/M2

## 2025-02-18 DIAGNOSIS — Z12.31 ENCOUNTER FOR SCREENING MAMMOGRAM FOR MALIGNANT NEOPLASM OF BREAST: ICD-10-CM

## 2025-02-18 DIAGNOSIS — F32.9 REACTIVE DEPRESSION: ICD-10-CM

## 2025-02-18 DIAGNOSIS — R53.83 OTHER FATIGUE: Primary | ICD-10-CM

## 2025-02-18 DIAGNOSIS — E03.9 ACQUIRED HYPOTHYROIDISM: ICD-10-CM

## 2025-02-18 DIAGNOSIS — G89.29 HEEL PAIN, CHRONIC, LEFT: ICD-10-CM

## 2025-02-18 DIAGNOSIS — M79.672 HEEL PAIN, CHRONIC, LEFT: ICD-10-CM

## 2025-02-18 PROCEDURE — 1036F TOBACCO NON-USER: CPT | Performed by: INTERNAL MEDICINE

## 2025-02-18 PROCEDURE — 3008F BODY MASS INDEX DOCD: CPT | Performed by: INTERNAL MEDICINE

## 2025-02-18 PROCEDURE — 99214 OFFICE O/P EST MOD 30 MIN: CPT | Performed by: INTERNAL MEDICINE

## 2025-02-18 RX ORDER — ESCITALOPRAM OXALATE 20 MG/1
20 TABLET ORAL DAILY
Qty: 100 TABLET | Refills: 1 | Status: SHIPPED | OUTPATIENT
Start: 2025-02-18 | End: 2025-08-17

## 2025-02-18 RX ORDER — LEVOTHYROXINE SODIUM 88 UG/1
88 TABLET ORAL DAILY
Qty: 100 TABLET | Refills: 1 | Status: SHIPPED | OUTPATIENT
Start: 2025-02-18 | End: 2026-02-18

## 2025-02-18 ASSESSMENT — ENCOUNTER SYMPTOMS
COUGH: 0
NAUSEA: 0
BACK PAIN: 0
ARTHRALGIAS: 0
BLOOD IN STOOL: 0
SHORTNESS OF BREATH: 0
DIARRHEA: 0
VOMITING: 0
FATIGUE: 1
WHEEZING: 0
ABDOMINAL PAIN: 0
PALPITATIONS: 0

## 2025-02-18 ASSESSMENT — PATIENT HEALTH QUESTIONNAIRE - PHQ9
SUM OF ALL RESPONSES TO PHQ9 QUESTIONS 1 AND 2: 0
1. LITTLE INTEREST OR PLEASURE IN DOING THINGS: NOT AT ALL
2. FEELING DOWN, DEPRESSED OR HOPELESS: NOT AT ALL

## 2025-02-18 NOTE — PATIENT INSTRUCTIONS
Patient instructions  As we discussed I did order a CBC just to make sure you do not have any anemia and such because of your fatigue.  Please go at your earliest convenience and you do not need to be fasting for this lab test.  Once the results are known I will contact you  Please also try to allow a little bit more time for rest and sleep.  If you feel like your fatigue does not improve over time please contact me  Please also contact me about getting a podiatry referral if your ankle does not improve  I sent refills for both medicines for 6 months and we will see her back in 6 months for checkup.

## 2025-02-18 NOTE — ASSESSMENT & PLAN NOTE
-She will try to devote more time to sleep and she will get a CBC.  I have agreed to contact her with results

## 2025-02-18 NOTE — ASSESSMENT & PLAN NOTE
-TSH came back well within desirable range so we will check this once a year and she will continue with her current dose of levothyroxine

## 2025-02-18 NOTE — PROGRESS NOTES
Subjective   Patient ID: Chaya Li is a 39 y.o. female who presents for Follow-up (2 MO CK).  HPI  She is here today for follow-up.  Since our last visit she was able to start counseling and she states so far it has been beneficial.  She also states her daughter has started counseling which she is very glad about.  She also has been doing better on her Lexapro and we will continue the current dose for now.  She also went for her TSH which came back perfect this time.  We will keep her medication the same and check a TSH again in 1 year or sooner if any problems.  We talked about her heel pain and her x-ray came back negative.  She is going to try an over-the-counter orthotic and she states she also make sure that she has quality shoes.  If she does not get better she will simply call and we will refer her to podiatry.  She is planning on traveling abroad and will need to make sure her feet are okay for all the walking she will be doing.  She also complains of feeling tired but she is very busy.  She is constantly on the go doing things for her children and planning activities.  She states she tries to get 6 hours of sleep and we recommend she try a little bit more time for sleep.  She will stop and get a CBC for safe measure and have agreed to contact her with results.  If everything goes according to plan we will see her back in 6 months for another checkup.  Review of Systems   Constitutional:  Positive for fatigue.   Respiratory:  Negative for cough, shortness of breath and wheezing.    Cardiovascular:  Negative for chest pain, palpitations and leg swelling.   Gastrointestinal:  Negative for abdominal pain, blood in stool, diarrhea, nausea and vomiting.   Musculoskeletal:  Negative for arthralgias and back pain.     Objective   Physical Exam  Vitals and nursing note reviewed.   Constitutional:       General: She is not in acute distress.     Appearance: Normal appearance.   HENT:      Head:  Normocephalic and atraumatic.   Eyes:      Conjunctiva/sclera: Conjunctivae normal.   Cardiovascular:      Rate and Rhythm: Normal rate and regular rhythm.      Heart sounds: Normal heart sounds.   Pulmonary:      Effort: No respiratory distress.      Breath sounds: No wheezing.   Abdominal:      Palpations: Abdomen is soft.      Tenderness: There is no abdominal tenderness. There is no guarding.   Musculoskeletal:         General: No swelling. Normal range of motion.   Skin:     General: Skin is warm and dry.   Neurological:      General: No focal deficit present.      Mental Status: She is alert and oriented to person, place, and time.   Psychiatric:         Behavior: Behavior normal.       Recent Results (from the past 4 weeks)   Thyroid Stimulating Hormone    Collection Time: 02/14/25  1:14 PM   Result Value Ref Range    TSH 2.81 mIU/L       Assessment/Plan   Problem List Items Addressed This Visit             ICD-10-CM    Encounter for screening mammogram for malignant neoplasm of breast Z12.31    Relevant Orders    BI mammo bilateral screening tomosynthesis    Other fatigue - Primary R53.83     -She will try to devote more time to sleep and she will get a CBC.  I have agreed to contact her with results         Relevant Orders    CBC and Auto Differential    Reactive depression F32.9     -She is doing better on the higher dose of Lexapro and with counseling.  -She will call if things are not going well and otherwise we will see her back in 6 months         Relevant Medications    escitalopram (Lexapro) 20 mg tablet    Acquired hypothyroidism E03.9     -TSH came back well within desirable range so we will check this once a year and she will continue with her current dose of levothyroxine         Relevant Medications    levothyroxine (Synthroid, Levoxyl) 88 mcg tablet    Heel pain, chronic, left M79.672, G89.29     -Her x-ray did not show any architectural abnormalities  -She will try the over-the-counter  orthotic and if she does not improve all she needs to do is call and we will refer her to podiatry        Patient instructions  As we discussed I did order a CBC just to make sure you do not have any anemia and such because of your fatigue.  Please go at your earliest convenience and you do not need to be fasting for this lab test.  Once the results are known I will contact you  Please also try to allow a little bit more time for rest and sleep.  If you feel like your fatigue does not improve over time please contact me  Please also contact me about getting a podiatry referral if your ankle does not improve  I sent refills for both medicines for 6 months and we will see her back in 6 months for checkup.       Roselia De Jesus, DO

## 2025-02-18 NOTE — ASSESSMENT & PLAN NOTE
-Her x-ray did not show any architectural abnormalities  -She will try the over-the-counter orthotic and if she does not improve all she needs to do is call and we will refer her to podiatry

## 2025-03-17 DIAGNOSIS — F32.9 REACTIVE DEPRESSION: ICD-10-CM

## 2025-03-17 RX ORDER — ESCITALOPRAM OXALATE 20 MG/1
20 TABLET ORAL DAILY
Qty: 30 TABLET | Refills: 0 | Status: SHIPPED | OUTPATIENT
Start: 2025-03-17 | End: 2025-09-13

## 2025-06-23 ENCOUNTER — APPOINTMENT (OUTPATIENT)
Dept: RADIOLOGY | Facility: CLINIC | Age: 40
End: 2025-06-23
Payer: COMMERCIAL

## 2025-07-01 ENCOUNTER — APPOINTMENT (OUTPATIENT)
Dept: RADIOLOGY | Facility: CLINIC | Age: 40
End: 2025-07-01
Payer: COMMERCIAL

## 2025-07-01 VITALS — HEIGHT: 66 IN | WEIGHT: 204.6 LBS | BODY MASS INDEX: 32.88 KG/M2

## 2025-07-01 DIAGNOSIS — Z12.31 ENCOUNTER FOR SCREENING MAMMOGRAM FOR MALIGNANT NEOPLASM OF BREAST: ICD-10-CM

## 2025-07-01 PROCEDURE — 77063 BREAST TOMOSYNTHESIS BI: CPT | Performed by: RADIOLOGY

## 2025-07-01 PROCEDURE — 77067 SCR MAMMO BI INCL CAD: CPT | Performed by: RADIOLOGY

## 2025-07-01 PROCEDURE — 77063 BREAST TOMOSYNTHESIS BI: CPT

## 2025-08-19 ENCOUNTER — APPOINTMENT (OUTPATIENT)
Age: 40
End: 2025-08-19
Payer: COMMERCIAL

## 2025-08-26 ENCOUNTER — APPOINTMENT (OUTPATIENT)
Age: 40
End: 2025-08-26
Payer: COMMERCIAL

## 2025-08-26 VITALS
HEART RATE: 61 BPM | HEIGHT: 66 IN | SYSTOLIC BLOOD PRESSURE: 132 MMHG | DIASTOLIC BLOOD PRESSURE: 78 MMHG | WEIGHT: 211.7 LBS | BODY MASS INDEX: 34.02 KG/M2 | OXYGEN SATURATION: 98 %

## 2025-08-26 DIAGNOSIS — E03.9 ACQUIRED HYPOTHYROIDISM: ICD-10-CM

## 2025-08-26 DIAGNOSIS — R69 TAKING MEDICATION FOR CHRONIC DISEASE: Primary | ICD-10-CM

## 2025-08-26 DIAGNOSIS — F32.9 REACTIVE DEPRESSION: ICD-10-CM

## 2025-08-26 PROBLEM — R53.83 OTHER FATIGUE: Status: RESOLVED | Noted: 2024-03-26 | Resolved: 2025-08-26

## 2025-08-26 PROBLEM — G89.29 HEEL PAIN, CHRONIC, LEFT: Status: RESOLVED | Noted: 2024-12-17 | Resolved: 2025-08-26

## 2025-08-26 PROBLEM — Z12.31 ENCOUNTER FOR SCREENING MAMMOGRAM FOR MALIGNANT NEOPLASM OF BREAST: Status: RESOLVED | Noted: 2024-03-26 | Resolved: 2025-08-26

## 2025-08-26 PROBLEM — M79.672 HEEL PAIN, CHRONIC, LEFT: Status: RESOLVED | Noted: 2024-12-17 | Resolved: 2025-08-26

## 2025-08-26 PROCEDURE — 3008F BODY MASS INDEX DOCD: CPT | Performed by: INTERNAL MEDICINE

## 2025-08-26 PROCEDURE — 99213 OFFICE O/P EST LOW 20 MIN: CPT | Performed by: INTERNAL MEDICINE

## 2025-08-26 PROCEDURE — 1036F TOBACCO NON-USER: CPT | Performed by: INTERNAL MEDICINE

## 2025-08-26 RX ORDER — LEVOTHYROXINE SODIUM 88 UG/1
88 TABLET ORAL DAILY
Qty: 100 TABLET | Refills: 1 | Status: SHIPPED | OUTPATIENT
Start: 2025-08-26 | End: 2026-08-26

## 2025-08-26 RX ORDER — ESCITALOPRAM OXALATE 20 MG/1
20 TABLET ORAL DAILY
Qty: 100 TABLET | Refills: 1 | Status: SHIPPED | OUTPATIENT
Start: 2025-08-26 | End: 2026-02-22

## 2025-08-26 ASSESSMENT — ENCOUNTER SYMPTOMS
SHORTNESS OF BREATH: 0
ABDOMINAL PAIN: 0
ARTHRALGIAS: 0
FATIGUE: 0
BLOOD IN STOOL: 0
WHEEZING: 0
NAUSEA: 0
DIARRHEA: 0
PALPITATIONS: 0
VOMITING: 0
BACK PAIN: 0
COUGH: 0
CHEST TIGHTNESS: 0

## 2025-08-26 ASSESSMENT — PATIENT HEALTH QUESTIONNAIRE - PHQ9
2. FEELING DOWN, DEPRESSED OR HOPELESS: NOT AT ALL
1. LITTLE INTEREST OR PLEASURE IN DOING THINGS: NOT AT ALL
SUM OF ALL RESPONSES TO PHQ9 QUESTIONS 1 AND 2: 0

## 2026-02-24 ENCOUNTER — APPOINTMENT (OUTPATIENT)
Age: 41
End: 2026-02-24
Payer: COMMERCIAL